# Patient Record
(demographics unavailable — no encounter records)

---

## 2025-02-10 NOTE — HISTORY OF PRESENT ILLNESS
[Healed] : healed [Sutures Removed] : sutures were removed [Steri-Strips Removed & Replaced] : steri-strips removed and replaced [de-identified] : DOS 1/30/2025 [de-identified] : 11 days status post excision of left soft tissue mass.  Patient presents for first postop appointment.  Pathology showed neurofibroma. [de-identified] : Incision well-healed.  Ulnar digital nerve to small finger and dorsal sensory branch of ulnar nerve intact no Tinel's sign [de-identified] : 11 days s/p excision of left hand soft tissue mass [de-identified] : Sutures removed, benzoin Steri-Strips applied patient will do a home exercise program and scar management avoid sun exposure to the incision and if doing well no further follow-up required.  Will look out for recurrence

## 2025-02-10 NOTE — HISTORY OF PRESENT ILLNESS
[Healed] : healed [Sutures Removed] : sutures were removed [Steri-Strips Removed & Replaced] : steri-strips removed and replaced [de-identified] : DOS 1/30/2025 [de-identified] : 11 days status post excision of left soft tissue mass.  Patient presents for first postop appointment.  Pathology showed neurofibroma. [de-identified] : Incision well-healed.  Ulnar digital nerve to small finger and dorsal sensory branch of ulnar nerve intact no Tinel's sign [de-identified] : 11 days s/p excision of left hand soft tissue mass [de-identified] : Sutures removed, benzoin Steri-Strips applied patient will do a home exercise program and scar management avoid sun exposure to the incision and if doing well no further follow-up required.  Will look out for recurrence